# Patient Record
(demographics unavailable — no encounter records)

---

## 2025-03-27 NOTE — HISTORY OF PRESENT ILLNESS
[de-identified] : Pt presents for PP f/u #1  s/p uncomplicated   readmitted --- for PEC w/SF, discharged on labetalol 200mg TID  BP logs reviewed and Bps labile, 130s-150s/80s-90s mostly 130s/80s pt has large bp cuff ordered online, cuff used in person and numbers checked on office BP cuff, cuff showing higher systolics of ~5 units and higher diastolics of 10-15units, new bp cuff Rx sent- pt to obtain smaller cuff  denies PEC symptoms  continue home bp logs  PEC precautions reviewed  continue routine PP care  new bp Rx sent- RTO Tuesday for f/u

## 2025-04-01 NOTE — HISTORY OF PRESENT ILLNESS
[de-identified] :  Pt presents for PP f/u #2  s/p uncomplicated  readmitted postpartum for PEC w/SF, discharged on labetalol 200mg TID BP logs reviewed and Bps improved, now mostly 130s/80s Using smaller BP cuff denies PEC symptoms continue home bp logs PEC precautions reviewed continue routine PP care RTO in 2 weeks for f/u and BP check  will plan to wean bp meds as tolerated

## 2025-04-11 NOTE — HISTORY OF PRESENT ILLNESS
[de-identified] :  Pt presents for PP f/u #3 s/p uncomplicated  readmitted postpartum for PEC w/SF, discharged on labetalol 200mg TID BP logs reviewed and Bps improved, now mostly 120s/80s Using smaller BP cuff denies PEC symptoms continue home bp logs PEC precautions reviewed continue routine PP care RTO in 3 weeks for f/u and BP check will plan to wean bp meds as tolerated.

## 2025-04-29 NOTE — PLAN
[FreeTextEntry1] : can reduce labetalol dose to BID and  monitor bps  f/u 1 month for bp log review  discussed pp contraception options, pt going to hold off on use until nursing complete, then will opt for VEE

## 2025-04-29 NOTE — HISTORY OF PRESENT ILLNESS
[TextBox_4] : pt presents for pp check  also wants annual performed today  s/p uncomplicated  readmitted postpartum for PEC w/SF, discharged on labetalol 200mg TID bps well controlled

## 2025-04-29 NOTE — PHYSICAL EXAM
[MA] : MA [FreeTextEntry2] : arturo  [Soft] : soft [Non-tender] : non-tender [Non-distended] : non-distended [Examination Of The Breasts] : a normal appearance [Breast Palpation Diffuse Fibrous Tissue Bilateral] : fibrocystic changes [No Masses] : no breast masses were palpable [Labia Majora] : normal [Labia Minora] : normal [Normal] : normal